# Patient Record
Sex: FEMALE | Race: WHITE | NOT HISPANIC OR LATINO | URBAN - METROPOLITAN AREA
[De-identification: names, ages, dates, MRNs, and addresses within clinical notes are randomized per-mention and may not be internally consistent; named-entity substitution may affect disease eponyms.]

---

## 2023-05-01 DIAGNOSIS — F39 MOOD DISORDER (HCC): Primary | ICD-10-CM

## 2024-09-25 ENCOUNTER — APPOINTMENT (OUTPATIENT)
Dept: ORTHOPEDIC SURGERY | Facility: CLINIC | Age: 39
End: 2024-09-25
Payer: COMMERCIAL

## 2024-09-25 ENCOUNTER — HOSPITAL ENCOUNTER (OUTPATIENT)
Dept: RADIOLOGY | Facility: CLINIC | Age: 39
Discharge: HOME | End: 2024-09-25
Payer: COMMERCIAL

## 2024-09-25 VITALS — BODY MASS INDEX: 30.06 KG/M2 | HEIGHT: 70 IN | WEIGHT: 210 LBS

## 2024-09-25 DIAGNOSIS — M62.562 ATROPHY OF CALF MUSCLES ON LEFT: ICD-10-CM

## 2024-09-25 DIAGNOSIS — M72.2 PLANTAR FASCIITIS OF RIGHT FOOT: ICD-10-CM

## 2024-09-25 DIAGNOSIS — M79.671 RIGHT FOOT PAIN: ICD-10-CM

## 2024-09-25 PROCEDURE — 1036F TOBACCO NON-USER: CPT | Performed by: ORTHOPAEDIC SURGERY

## 2024-09-25 PROCEDURE — 73630 X-RAY EXAM OF FOOT: CPT | Mod: RIGHT SIDE | Performed by: RADIOLOGY

## 2024-09-25 PROCEDURE — 99203 OFFICE O/P NEW LOW 30 MIN: CPT | Performed by: ORTHOPAEDIC SURGERY

## 2024-09-25 PROCEDURE — 3008F BODY MASS INDEX DOCD: CPT | Performed by: ORTHOPAEDIC SURGERY

## 2024-09-25 PROCEDURE — 73630 X-RAY EXAM OF FOOT: CPT | Mod: RT

## 2024-09-25 RX ORDER — METHYLPREDNISOLONE 4 MG/1
TABLET ORAL
Qty: 21 TABLET | Refills: 0 | Status: SHIPPED | OUTPATIENT
Start: 2024-09-25

## 2024-09-25 RX ORDER — MELOXICAM 15 MG/1
TABLET ORAL
Qty: 30 TABLET | Refills: 1 | Status: SHIPPED | OUTPATIENT
Start: 2024-09-25

## 2024-09-25 NOTE — PROGRESS NOTES
Subjective    Patient ID: Sonia Cook is a 38 y.o. female.    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  She describes majority the pain along the plantar aspect of her foot.  Pain is made worse with activity such as sports.  She notes the first steps in the morning to be the most painful.  She has tried wearing a boot at night with limited benefit.  She has also tried topical medication such as Voltaren gel and oral anti-inflammatories over-the-counter.  She has been doing stretching exercises as well.  She does note that she does have tight gastrocs.  She is not experiencing any numbness nor tingling.    This patient's past medical, social, and family history were reviewed as well as a review of systems including updates on the patient's information encounter sheet  Patient denies a history of diabetes  Previous history of left Achilles tendon tear    Physical Examination  Constitutional: Patient's height and weight reviewed, appears well kempt  Psychiatric: Alert and oriented ×3, appropriate mood and behavior  Pulmonary: Breathing appears nonlabored, no apparent distress  Lymphatic: No appreciable lymphadenopathy to both the upper and lower extremities  Skin: No open lesions, rashes, ulcerations  Neurologic: Gross motor and sensory exam appear intact (except for abnormalities noted in the below muscle skeletal exam)    Musculoskeletal: There appears to be satisfactory range of motion of the right knee without pain elicited.  The right ankle is well aligned and appears to be stable ligamentous examination.  There is marked tenderness along the course of the plantar fascia but most significantly at its origin off of the calcaneus.  There is normal appearance to the arch.  There is no evidence of tearing of the plantar fascia.  No ecchymosis identified.  No significant  swelling.  Ambulates in the office today without the use of an assistive device.    X-rays reveal the left foot is well aligned.  Joint spaces well-maintained.  No significant arthritic changes.  Small spur off the inferior aspect of the calcaneus    Assessment: Chronic plantar fasciitis    Plan: The patient was educated to the appropriate stretching exercises as well as the fact she has now been referred to physical therapy.  We have suggested modification of shoewear but she is typically wears the same style that she has her whole career.  Because of the persistence of her pain we elected to start with a Medrol Dosepak to be followed eventually with meloxicam.  I have also referred her to Dr. Chan for additional treatment options including potential consideration for PRP or other modalities.          Current Outpatient Medications:     meloxicam (Mobic) 15 mg tablet, Take one tablet once daily, Disp: 30 tablet, Rfl: 1    methylPREDNISolone (Medrol Dospak) 4 mg tablets, Use as directed by package instructions, Disp: 21 tablet, Rfl: 0